# Patient Record
Sex: FEMALE | Race: WHITE | NOT HISPANIC OR LATINO | Employment: FULL TIME | URBAN - METROPOLITAN AREA
[De-identification: names, ages, dates, MRNs, and addresses within clinical notes are randomized per-mention and may not be internally consistent; named-entity substitution may affect disease eponyms.]

---

## 2024-06-19 ENCOUNTER — HOSPITAL ENCOUNTER (EMERGENCY)
Facility: HOSPITAL | Age: 49
Discharge: HOME | End: 2024-06-19
Attending: STUDENT IN AN ORGANIZED HEALTH CARE EDUCATION/TRAINING PROGRAM
Payer: COMMERCIAL

## 2024-06-19 ENCOUNTER — OFFICE VISIT (OUTPATIENT)
Dept: FAMILY MEDICINE CLINIC | Facility: CLINIC | Age: 49
End: 2024-06-19
Payer: COMMERCIAL

## 2024-06-19 VITALS
OXYGEN SATURATION: 100 % | HEART RATE: 82 BPM | BODY MASS INDEX: 24.11 KG/M2 | WEIGHT: 150 LBS | RESPIRATION RATE: 17 BRPM | TEMPERATURE: 99.3 F | DIASTOLIC BLOOD PRESSURE: 110 MMHG | HEIGHT: 66 IN | SYSTOLIC BLOOD PRESSURE: 166 MMHG

## 2024-06-19 VITALS
OXYGEN SATURATION: 100 % | WEIGHT: 150 LBS | HEIGHT: 67 IN | DIASTOLIC BLOOD PRESSURE: 118 MMHG | BODY MASS INDEX: 23.54 KG/M2 | TEMPERATURE: 97.5 F | SYSTOLIC BLOOD PRESSURE: 204 MMHG | HEART RATE: 84 BPM

## 2024-06-19 DIAGNOSIS — Z12.11 SCREENING FOR MALIGNANT NEOPLASM OF COLON: ICD-10-CM

## 2024-06-19 DIAGNOSIS — Z82.49 FAMILY HISTORY OF HEART DISEASE IN MALE FAMILY MEMBER BEFORE AGE 55: ICD-10-CM

## 2024-06-19 DIAGNOSIS — I10 ASYMPTOMATIC HYPERTENSION: Primary | ICD-10-CM

## 2024-06-19 DIAGNOSIS — Z12.31 ENCOUNTER FOR SCREENING MAMMOGRAM FOR MALIGNANT NEOPLASM OF BREAST: ICD-10-CM

## 2024-06-19 DIAGNOSIS — I10 HYPERTENSION, UNCONTROLLED: Primary | ICD-10-CM

## 2024-06-19 LAB
ALBUMIN SERPL-MCNC: 4.6 G/DL (ref 3.5–5.2)
ALBUMIN/GLOB SERPL: 1.7 G/DL
ALP SERPL-CCNC: 59 U/L (ref 39–117)
ALT SERPL W P-5'-P-CCNC: 20 U/L (ref 1–33)
ANION GAP SERPL CALCULATED.3IONS-SCNC: 14 MMOL/L (ref 5–15)
AST SERPL-CCNC: 28 U/L (ref 1–32)
BASOPHILS # BLD AUTO: 0.02 10*3/MM3 (ref 0–0.2)
BASOPHILS NFR BLD AUTO: 0.3 % (ref 0–1.5)
BILIRUB SERPL-MCNC: 0.5 MG/DL (ref 0–1.2)
BUN SERPL-MCNC: 10 MG/DL (ref 6–20)
BUN/CREAT SERPL: 14.9 (ref 7–25)
CALCIUM SPEC-SCNC: 9.6 MG/DL (ref 8.6–10.5)
CHLORIDE SERPL-SCNC: 105 MMOL/L (ref 98–107)
CO2 SERPL-SCNC: 20 MMOL/L (ref 22–29)
CREAT SERPL-MCNC: 0.67 MG/DL (ref 0.57–1)
DEPRECATED RDW RBC AUTO: 43.2 FL (ref 37–54)
EGFRCR SERPLBLD CKD-EPI 2021: 107.3 ML/MIN/1.73
EOSINOPHIL # BLD AUTO: 0.12 10*3/MM3 (ref 0–0.4)
EOSINOPHIL NFR BLD AUTO: 1.9 % (ref 0.3–6.2)
ERYTHROCYTE [DISTWIDTH] IN BLOOD BY AUTOMATED COUNT: 12.4 % (ref 12.3–15.4)
GLOBULIN UR ELPH-MCNC: 2.7 GM/DL
GLUCOSE SERPL-MCNC: 103 MG/DL (ref 65–99)
HCT VFR BLD AUTO: 38.2 % (ref 34–46.6)
HGB BLD-MCNC: 13.1 G/DL (ref 12–15.9)
IMM GRANULOCYTES # BLD AUTO: 0.01 10*3/MM3 (ref 0–0.05)
IMM GRANULOCYTES NFR BLD AUTO: 0.2 % (ref 0–0.5)
LYMPHOCYTES # BLD AUTO: 1.37 10*3/MM3 (ref 0.7–3.1)
LYMPHOCYTES NFR BLD AUTO: 21.7 % (ref 19.6–45.3)
MCH RBC QN AUTO: 32.8 PG (ref 26.6–33)
MCHC RBC AUTO-ENTMCNC: 34.3 G/DL (ref 31.5–35.7)
MCV RBC AUTO: 95.5 FL (ref 79–97)
MONOCYTES # BLD AUTO: 0.31 10*3/MM3 (ref 0.1–0.9)
MONOCYTES NFR BLD AUTO: 4.9 % (ref 5–12)
NEUTROPHILS NFR BLD AUTO: 4.47 10*3/MM3 (ref 1.7–7)
NEUTROPHILS NFR BLD AUTO: 71 % (ref 42.7–76)
NRBC BLD AUTO-RTO: 0 /100 WBC (ref 0–0.2)
NT-PROBNP SERPL-MCNC: 57 PG/ML (ref 0–450)
PLATELET # BLD AUTO: 230 10*3/MM3 (ref 140–450)
PMV BLD AUTO: 9.2 FL (ref 6–12)
POTASSIUM SERPL-SCNC: 3.9 MMOL/L (ref 3.5–5.2)
PROT SERPL-MCNC: 7.3 G/DL (ref 6–8.5)
QT INTERVAL: 429 MS
QTC INTERVAL: 505 MS
RBC # BLD AUTO: 4 10*6/MM3 (ref 3.77–5.28)
SODIUM SERPL-SCNC: 139 MMOL/L (ref 136–145)
TROPONIN T SERPL HS-MCNC: 7 NG/L
WBC NRBC COR # BLD AUTO: 6.3 10*3/MM3 (ref 3.4–10.8)

## 2024-06-19 PROCEDURE — 83880 ASSAY OF NATRIURETIC PEPTIDE: CPT | Performed by: STUDENT IN AN ORGANIZED HEALTH CARE EDUCATION/TRAINING PROGRAM

## 2024-06-19 PROCEDURE — 99283 EMERGENCY DEPT VISIT LOW MDM: CPT

## 2024-06-19 PROCEDURE — 99204 OFFICE O/P NEW MOD 45 MIN: CPT

## 2024-06-19 PROCEDURE — 93005 ELECTROCARDIOGRAM TRACING: CPT | Performed by: STUDENT IN AN ORGANIZED HEALTH CARE EDUCATION/TRAINING PROGRAM

## 2024-06-19 PROCEDURE — 93010 ELECTROCARDIOGRAM REPORT: CPT | Performed by: INTERNAL MEDICINE

## 2024-06-19 PROCEDURE — 93000 ELECTROCARDIOGRAM COMPLETE: CPT

## 2024-06-19 PROCEDURE — 84484 ASSAY OF TROPONIN QUANT: CPT | Performed by: STUDENT IN AN ORGANIZED HEALTH CARE EDUCATION/TRAINING PROGRAM

## 2024-06-19 PROCEDURE — 36415 COLL VENOUS BLD VENIPUNCTURE: CPT

## 2024-06-19 PROCEDURE — 85025 COMPLETE CBC W/AUTO DIFF WBC: CPT | Performed by: STUDENT IN AN ORGANIZED HEALTH CARE EDUCATION/TRAINING PROGRAM

## 2024-06-19 PROCEDURE — 80053 COMPREHEN METABOLIC PANEL: CPT | Performed by: STUDENT IN AN ORGANIZED HEALTH CARE EDUCATION/TRAINING PROGRAM

## 2024-06-19 RX ORDER — AMLODIPINE BESYLATE 5 MG/1
5 TABLET ORAL
Status: DISCONTINUED | OUTPATIENT
Start: 2024-06-19 | End: 2024-06-19 | Stop reason: HOSPADM

## 2024-06-19 RX ORDER — FLUTICASONE PROPIONATE 50 MCG
2 SPRAY, SUSPENSION (ML) NASAL DAILY
COMMUNITY

## 2024-06-19 RX ORDER — AMLODIPINE BESYLATE 5 MG/1
5 TABLET ORAL DAILY
Qty: 30 TABLET | Refills: 0 | Status: SHIPPED | OUTPATIENT
Start: 2024-06-19 | End: 2024-07-19

## 2024-06-19 RX ORDER — CETIRIZINE HYDROCHLORIDE 10 MG/1
10 TABLET ORAL DAILY
COMMUNITY

## 2024-06-19 RX ADMIN — AMLODIPINE BESYLATE 5 MG: 5 TABLET ORAL at 13:42

## 2024-06-19 NOTE — ED NOTES
Pt sent from PCP    Pt bp was 210/100. Pt does not take any BP medications. Pt denies HA or any symptoms.

## 2024-06-19 NOTE — ED PROVIDER NOTES
EMERGENCY DEPARTMENT ENCOUNTER  Room Number:  36/36  PCP: Marisa Pérez APRN  Independent Historians: Patient      HPI:  Chief Complaint: had concerns including Hypertension.     Context: Katheryn Mcarthur is a 49 y.o. female with a medical history of seasonal allergies who presents to the ED c/o acute hypertension.  Patient recently had dental work done and was informed by her dentist that she had high blood pressure.  Patient seen by primary care doctor today and told she had stroke level hypertension and sent to the emergency department for further evaluation.  Patient denies chest pain, shortness of breath, lightheadedness, changes in mental status.  Patient is not on any medication.      Review of prior external notes (non-ED) -and- Review of prior external test results outside of this encounter: Office visit from earlier today with family medicine reviewed and notable for new onset hypertension that was asymptomatic.  Patient was sent to the emergency department for further evaluation.    Prescription drug monitoring program review:         PAST MEDICAL HISTORY  Active Ambulatory Problems     Diagnosis Date Noted    No Active Ambulatory Problems     Resolved Ambulatory Problems     Diagnosis Date Noted    No Resolved Ambulatory Problems     Past Medical History:   Diagnosis Date    Allergic          PAST SURGICAL HISTORY  Past Surgical History:   Procedure Laterality Date    SKIN CANCER EXCISION      WISDOM TOOTH EXTRACTION           FAMILY HISTORY  Family History   Problem Relation Age of Onset    Breast cancer Mother     Heart attack Father     Heart failure Father     No Known Problems Brother          SOCIAL HISTORY  Social History     Socioeconomic History    Marital status:    Tobacco Use    Smoking status: Former     Types: Cigarettes    Smokeless tobacco: Never   Vaping Use    Vaping status: Never Used   Substance and Sexual Activity    Alcohol use: Yes     Alcohol/week: 3.0 standard drinks of  alcohol     Types: 3 Shots of liquor per week    Drug use: Never    Sexual activity: Yes     Partners: Male     Birth control/protection: None         ALLERGIES  Azithromycin      REVIEW OF SYSTEMS  Review of Systems  Included in HPI  All systems reviewed and negative except for those discussed in HPI.      PHYSICAL EXAM    I have reviewed the triage vital signs and nursing notes.    ED Triage Vitals   Temp Heart Rate Resp BP SpO2   06/19/24 1234 06/19/24 1234 06/19/24 1234 06/19/24 1238 06/19/24 1234   99.3 °F (37.4 °C) 102 18 (!) 197/130 98 %      Temp src Heart Rate Source Patient Position BP Location FiO2 (%)   06/19/24 1234 06/19/24 1234 06/19/24 1238 06/19/24 1238 --   Tympanic Monitor Lying Right arm        Physical Exam  GENERAL: alert, no acute distress  SKIN: Warm, dry  HENT: Normocephalic, atraumatic  EYES: no scleral icterus  CV: regular rhythm, regular rate  RESPIRATORY: normal effort, lungs clear  ABDOMEN: soft, nontender, nondistended  MUSCULOSKELETAL: no deformity  NEURO: alert, moves all extremities, follows commands            LAB RESULTS  Recent Results (from the past 24 hour(s))   ECG 12 Lead Chest Pain    Collection Time: 06/19/24  1:01 PM   Result Value Ref Range    QT Interval 429 ms    QTC Interval 505 ms   Comprehensive Metabolic Panel    Collection Time: 06/19/24  1:09 PM    Specimen: Blood   Result Value Ref Range    Glucose 103 (H) 65 - 99 mg/dL    BUN 10 6 - 20 mg/dL    Creatinine 0.67 0.57 - 1.00 mg/dL    Sodium 139 136 - 145 mmol/L    Potassium 3.9 3.5 - 5.2 mmol/L    Chloride 105 98 - 107 mmol/L    CO2 20.0 (L) 22.0 - 29.0 mmol/L    Calcium 9.6 8.6 - 10.5 mg/dL    Total Protein 7.3 6.0 - 8.5 g/dL    Albumin 4.6 3.5 - 5.2 g/dL    ALT (SGPT) 20 1 - 33 U/L    AST (SGOT) 28 1 - 32 U/L    Alkaline Phosphatase 59 39 - 117 U/L    Total Bilirubin 0.5 0.0 - 1.2 mg/dL    Globulin 2.7 gm/dL    A/G Ratio 1.7 g/dL    BUN/Creatinine Ratio 14.9 7.0 - 25.0    Anion Gap 14.0 5.0 - 15.0 mmol/L    eGFR  107.3 >60.0 mL/min/1.73   Single High Sensitivity Troponin T    Collection Time: 06/19/24  1:09 PM    Specimen: Blood   Result Value Ref Range    HS Troponin T 7 <14 ng/L   BNP    Collection Time: 06/19/24  1:09 PM    Specimen: Blood   Result Value Ref Range    proBNP 57.0 0.0 - 450.0 pg/mL   CBC Auto Differential    Collection Time: 06/19/24  1:09 PM    Specimen: Blood   Result Value Ref Range    WBC 6.30 3.40 - 10.80 10*3/mm3    RBC 4.00 3.77 - 5.28 10*6/mm3    Hemoglobin 13.1 12.0 - 15.9 g/dL    Hematocrit 38.2 34.0 - 46.6 %    MCV 95.5 79.0 - 97.0 fL    MCH 32.8 26.6 - 33.0 pg    MCHC 34.3 31.5 - 35.7 g/dL    RDW 12.4 12.3 - 15.4 %    RDW-SD 43.2 37.0 - 54.0 fl    MPV 9.2 6.0 - 12.0 fL    Platelets 230 140 - 450 10*3/mm3    Neutrophil % 71.0 42.7 - 76.0 %    Lymphocyte % 21.7 19.6 - 45.3 %    Monocyte % 4.9 (L) 5.0 - 12.0 %    Eosinophil % 1.9 0.3 - 6.2 %    Basophil % 0.3 0.0 - 1.5 %    Immature Grans % 0.2 0.0 - 0.5 %    Neutrophils, Absolute 4.47 1.70 - 7.00 10*3/mm3    Lymphocytes, Absolute 1.37 0.70 - 3.10 10*3/mm3    Monocytes, Absolute 0.31 0.10 - 0.90 10*3/mm3    Eosinophils, Absolute 0.12 0.00 - 0.40 10*3/mm3    Basophils, Absolute 0.02 0.00 - 0.20 10*3/mm3    Immature Grans, Absolute 0.01 0.00 - 0.05 10*3/mm3    nRBC 0.0 0.0 - 0.2 /100 WBC         RADIOLOGY  No Radiology Exams Resulted Within Past 24 Hours      MEDICATIONS GIVEN IN ER  Medications   amLODIPine (NORVASC) tablet 5 mg (5 mg Oral Given 6/19/24 1342)         ORDERS PLACED DURING THIS VISIT:  Orders Placed This Encounter   Procedures    Comprehensive Metabolic Panel    Single High Sensitivity Troponin T    BNP    CBC Auto Differential    ECG 12 Lead Chest Pain    CBC & Differential         OUTPATIENT MEDICATION MANAGEMENT:  Current Facility-Administered Medications Ordered in Epic   Medication Dose Route Frequency Provider Last Rate Last Admin    amLODIPine (NORVASC) tablet 5 mg  5 mg Oral Q24H Don Pineda MD   5 mg at 06/19/24 0036      Current Outpatient Medications Ordered in Epic   Medication Sig Dispense Refill    amLODIPine (NORVASC) 5 MG tablet Take 1 tablet by mouth Daily for 30 days. 30 tablet 0    cetirizine (zyrTEC) 10 MG tablet Take 1 tablet by mouth Daily.      fluticasone (FLONASE) 50 MCG/ACT nasal spray 2 sprays into the nostril(s) as directed by provider Daily.           PROCEDURES  Procedures            PROGRESS, DATA ANALYSIS, CONSULTS, AND MEDICAL DECISION MAKING  All labs have been independently interpreted by me.  All radiology studies have been reviewed by me. All EKG's have been independently viewed and interpreted by me.  Discussion below represents my analysis of pertinent findings related to patient's condition, differential diagnosis, treatment plan and final disposition.    Differential diagnosis includes but is not limited to asymptomatic hypertension, hypertensive emergency, hypertensive urgency.    Clinical Scores:                   ED Course as of 06/19/24 1401   Wed Jun 19, 2024   1311 EKG interpreted by me demonstrates sinus rhythm, rate 83, no [place] prolongation, borderline QT prolongation, no ST elevation, T wave flattening in the anterior leads and T wave inversions in lead III [MW]   1328 Explained to patient industry standards for asymptomatic hypertension and provided appropriate reassurance.  Will go ahead and proceed with basic laboratory evaluation and EKG.  Will initiate antihypertensive in the emergency department and counseled on need to continue following with primary care for titration and possible medication regimen review. [MW]   1400 Laboratory evaluation in the emergency department is unremarkable and EKG is reassuring.  Patient's blood pressure noted to improve after administration of oral antihypertensives.  Will prescribe 30-day course of antihypertensive and instruct patient on need to follow closely with primary care for medication titration.  Return precautions given and patient discharged  in stable condition. [MW]      ED Course User Index  [MW] Don Pineda MD             AS OF 14:01 EDT VITALS:    BP - (!) 160/112  HR - 80  TEMP - 99.3 °F (37.4 °C) (Tympanic)  O2 SATS - 100%    COMPLEXITY OF CARE  Admission was considered but after careful review of the patient's presentation, physical examination, diagnostic results, and response to treatment the patient may be safely discharged with outpatient follow-up.      DIAGNOSIS  Final diagnoses:   Asymptomatic hypertension         DISPOSITION  ED Disposition       ED Disposition   Discharge    Condition   Stable    Comment   --                Please note that portions of this document were completed with a voice recognition program.    Note Disclaimer: At [facility], we believe that sharing information builds trust and better relationships. You are receiving this note because you recently visited [facility]. It is possible you will see health information before a provider has talked with you about it. This kind of information can be easy to misunderstand. To help you fully understand what it means for your health, we urge you to discuss this note with your provider.         Dno Pineda MD  06/19/24 1652

## 2024-06-19 NOTE — PROGRESS NOTES
Chief Complaint  Establish Care and Hypertension    Subjective          Hypertension  Pertinent negatives include no chest pain, headaches, palpitations or shortness of breath.     Katheryn Mcarthur 49 y.o. female presents today for a new patient appointment. She is here to establish care, is a new patient to me, and due to an elevated blood pressure reading at the dentist office . I reviewed the PFSH recorded today by my MA/LPN staff.       Ms. Mcarthur's blood pressure is high today. She denies a personal history of hypertension and has never taken blood pressure medication.  She is completely asymptomatic.  She reports a recent elevated blood pressure reading at the dentist office.  She does not monitor blood pressure at home. She states she eats a healthy diet and is active.     She denies chest pain, shortness of breath, headaches, blurred vision, dizziness, lightheadedness, near-syncope, and syncope.    She has a family history of heart disease with her father. He had a MI at the age of 38 years, CABG X4 in his 40's, and hypertension. He is still living. No other family history.       Review of Systems   Constitutional:  Negative for chills, fatigue and fever.   HENT:  Negative for congestion and sinus pressure.    Eyes:  Negative for photophobia, pain and visual disturbance.   Respiratory:  Negative for cough, chest tightness and shortness of breath.    Cardiovascular:  Negative for chest pain, palpitations and leg swelling.   Gastrointestinal:  Negative for abdominal pain, constipation, diarrhea, nausea and vomiting.   Genitourinary:  Negative for dysuria, frequency and urgency.   Musculoskeletal:  Negative for back pain.   Skin:  Negative for rash.   Neurological:  Negative for dizziness, tremors, seizures, syncope, facial asymmetry, speech difficulty, weakness, light-headedness and numbness.   Psychiatric/Behavioral:  Negative for behavioral problems and sleep disturbance.         Objective   Vital Signs:  "  BP (!) 204/118   Pulse 84   Temp 97.5 °F (36.4 °C) (Oral)   Ht 168.9 cm (66.5\")   Wt 68 kg (150 lb)   SpO2 100%   BMI 23.85 kg/m²      BMI is within normal parameters. No other follow-up for BMI required.       Physical Exam  Vitals and nursing note reviewed.   Constitutional:       Appearance: She is well-developed and normal weight. She is not ill-appearing, toxic-appearing or diaphoretic.   HENT:      Head: Normocephalic.   Eyes:      General: No scleral icterus.     Pupils: Pupils are equal, round, and reactive to light.   Neck:      Thyroid: No thyromegaly.      Vascular: No carotid bruit.   Cardiovascular:      Rate and Rhythm: Normal rate and regular rhythm.      Pulses: Normal pulses.      Heart sounds: Normal heart sounds.   Pulmonary:      Effort: Pulmonary effort is normal. No respiratory distress.      Breath sounds: Normal breath sounds. No stridor.   Musculoskeletal:         General: No deformity.      Right lower leg: No edema.      Left lower leg: No edema.   Skin:     General: Skin is warm.      Coloration: Skin is not jaundiced.   Neurological:      General: No focal deficit present.      Mental Status: She is alert and oriented to person, place, and time.      Cranial Nerves: No dysarthria or facial asymmetry.      Sensory: Sensation is intact. No sensory deficit.      Motor: No weakness, tremor or seizure activity.      Coordination: Coordination is intact. Coordination normal.      Gait: Gait normal.   Psychiatric:         Behavior: Behavior normal.         Thought Content: Thought content normal.         Judgment: Judgment normal.          The following data was reviewed by: RENALDO Martinez on 06/19/2024:      ECG 12 Lead    Date/Time: 6/19/2024 11:51 AM  Performed by: Marisa Pérez APRN    Authorized by: Marisa Pérez APRN  Comparison: not compared with previous ECG   Previous ECG: no previous ECG available  Rhythm: sinus rhythm  Rate: normal  BPM: 76  Conduction: left anterior " fascicular block  Other findings: prolonged QTc interval    Clinical impression: abnormal EKG                Assessment and Plan      Assessment & Plan  Hypertension, uncontrolled  Patient has new onset Hypertension, asymptomatic  High manual BP reading X2  Ambulatory BP monitoring  Dietary sodium restriction.  EKG obtained-reviewed by Dr. Wilian Keita and myself  Blood pressure will be reassessed at the emergency department today and at the hospital follow up appointment upon discharge.  Declined EMS transport X3--signed refusal to be transported by ambulance form    Family history of heart disease in male family member before age 55  Father-MI at age 38 years, CABG X4 in his 40's  Encounter for screening mammogram for malignant neoplasm of breast    Screening for malignant neoplasm of colon      Orders Placed This Encounter   Procedures    Mammo screening digital tomosynthesis bilateral w CAD    Comprehensive metabolic panel    Lipid panel    TSH    Ambulatory Referral For Screening Colonoscopy    ECG 12 Lead    CBC and Differential                 Follow Up     Return after hospital discharge.    Patient was given instructions and counseling regarding her condition or for health maintenance advice. Please see specific information pulled into the AVS if appropriate.     -The patient was informed I recommend that she is transported to the emergency department by EMS for evaluation of severely elevated blood pressure.  The patient declined EMS transport.  I informed her of the risks of driving with blood pressure at this level and again recommended EMS transport.  She verbalized understanding, and stated again that she wants to drive herself.  The patient signed a refusal to be transported by ambulance form.  -Report called to [facility] ED Triage RN.

## 2024-06-19 NOTE — DISCHARGE INSTRUCTIONS
Please take all medication as prescribed    Please follow-up with your primary care provider as you will likely require dose adjustments and possible medication changes until finding the appropriate medication regimen.    Please return to the ER with new or worsening symptoms including but not limited to chest pain, shortness of breath, lightheadedness, changes in mental status, vision changes.

## 2024-07-09 ENCOUNTER — OFFICE VISIT (OUTPATIENT)
Dept: FAMILY MEDICINE CLINIC | Facility: CLINIC | Age: 49
End: 2024-07-09
Payer: COMMERCIAL

## 2024-07-09 VITALS
HEIGHT: 66 IN | SYSTOLIC BLOOD PRESSURE: 146 MMHG | RESPIRATION RATE: 16 BRPM | HEART RATE: 94 BPM | OXYGEN SATURATION: 100 % | DIASTOLIC BLOOD PRESSURE: 98 MMHG | WEIGHT: 151.6 LBS | BODY MASS INDEX: 24.36 KG/M2 | TEMPERATURE: 97.5 F

## 2024-07-09 DIAGNOSIS — I10 PRIMARY HYPERTENSION: Primary | ICD-10-CM

## 2024-07-09 PROCEDURE — 99213 OFFICE O/P EST LOW 20 MIN: CPT

## 2024-07-09 RX ORDER — AMLODIPINE BESYLATE 10 MG/1
10 TABLET ORAL DAILY
Qty: 90 TABLET | Refills: 1 | Status: SHIPPED | OUTPATIENT
Start: 2024-07-09

## 2024-07-09 NOTE — ASSESSMENT & PLAN NOTE
Hypertension is uncontrolled  Medication changes per orders.  Dietary sodium restriction.  Regular aerobic exercise.  Ambulatory blood pressure monitoring.  Blood pressure will be reassessed in 4 weeks.

## 2024-07-09 NOTE — PROGRESS NOTES
"Chief Complaint  Hypertension    Subjective          Hypertension  Pertinent negatives include no chest pain, headaches, palpitations or shortness of breath.     Katheryn Mcarthur 49 y.o. female presents for a follow up on hypertension. She has been monitoring BP at home and it has averaged 139/99 mmHg. She has been compliant with taking Amlodipine 5 mg daily.  She has tolerated Amlodipine well with no side effects.  She denies chest pain, shortness of breath, headaches, blurred vision, dizziness, lightheadedness, near-syncope, and syncope.    She has limited sodium-rich foods and caffeine. She drinks plenty of water. She exercises 3 days per week.  Blood pressure has improved, but is not at goal.    After a discussion with the patient today, she is in agreement to increase Amlodipine to 10 mg daily.        Review of Systems   Constitutional:  Negative for chills, fatigue and fever.   HENT:  Negative for congestion and sinus pressure.    Eyes:  Negative for visual disturbance.   Respiratory:  Negative for cough, chest tightness and shortness of breath.    Cardiovascular:  Negative for chest pain, palpitations and leg swelling.   Gastrointestinal:  Negative for abdominal pain, constipation, diarrhea, nausea and vomiting.   Genitourinary:  Negative for dysuria, frequency and urgency.   Musculoskeletal:  Negative for back pain.   Skin:  Negative for rash.   Neurological:  Negative for dizziness, syncope, facial asymmetry, speech difficulty, weakness and light-headedness.   Psychiatric/Behavioral:  Negative for behavioral problems and sleep disturbance.         Objective   Vital Signs:   /98 (BP Location: Left arm, Patient Position: Sitting, Cuff Size: Small Adult)   Pulse 94   Temp 97.5 °F (36.4 °C) (Oral)   Resp 16   Ht 167.6 cm (65.98\")   Wt 68.8 kg (151 lb 9.6 oz)   SpO2 100%   BMI 24.48 kg/m²      BMI is within normal parameters. No other follow-up for BMI required.       Physical Exam  Vitals and " nursing note reviewed.   Constitutional:       Appearance: She is well-developed and normal weight. She is not ill-appearing, toxic-appearing or diaphoretic.   HENT:      Head: Normocephalic.   Eyes:      General: No scleral icterus.     Pupils: Pupils are equal, round, and reactive to light.   Neck:      Thyroid: No thyromegaly.      Vascular: No carotid bruit.   Cardiovascular:      Rate and Rhythm: Normal rate and regular rhythm.      Pulses: Normal pulses.      Heart sounds: Normal heart sounds.   Pulmonary:      Effort: Pulmonary effort is normal. No respiratory distress.      Breath sounds: Normal breath sounds. No stridor.   Musculoskeletal:         General: No deformity.      Right lower leg: No edema.      Left lower leg: No edema.   Skin:     General: Skin is warm.      Coloration: Skin is not jaundiced.   Neurological:      General: No focal deficit present.      Mental Status: She is alert and oriented to person, place, and time.      Cranial Nerves: No dysarthria or facial asymmetry.      Sensory: Sensation is intact. No sensory deficit.      Motor: No weakness, tremor or seizure activity.      Coordination: Coordination is intact. Coordination normal.      Gait: Gait normal.   Psychiatric:         Behavior: Behavior normal.         Thought Content: Thought content normal.         Judgment: Judgment normal.                         Assessment and Plan      Assessment & Plan  Primary hypertension  Hypertension is uncontrolled  Medication changes per orders.  Dietary sodium restriction.  Regular aerobic exercise.  Ambulatory blood pressure monitoring.  Blood pressure will be reassessed in 4 weeks.     New Medications Ordered This Visit   Medications    amLODIPine (NORVASC) 10 MG tablet     Sig: Take 1 tablet by mouth Daily.     Dispense:  90 tablet     Refill:  1              Follow Up     Return in about 4 weeks (around 8/6/2024) for Next scheduled follow up.    Patient was given instructions and  counseling regarding her condition or for health maintenance advice. Please see specific information pulled into the AVS if appropriate.

## 2024-08-06 ENCOUNTER — OFFICE VISIT (OUTPATIENT)
Dept: FAMILY MEDICINE CLINIC | Facility: CLINIC | Age: 49
End: 2024-08-06
Payer: COMMERCIAL

## 2024-08-06 VITALS
HEIGHT: 66 IN | SYSTOLIC BLOOD PRESSURE: 140 MMHG | WEIGHT: 147 LBS | DIASTOLIC BLOOD PRESSURE: 86 MMHG | OXYGEN SATURATION: 95 % | BODY MASS INDEX: 23.63 KG/M2 | TEMPERATURE: 98.2 F | HEART RATE: 93 BPM

## 2024-08-06 DIAGNOSIS — I10 PRIMARY HYPERTENSION: Primary | ICD-10-CM

## 2024-08-06 PROCEDURE — 99213 OFFICE O/P EST LOW 20 MIN: CPT

## 2024-08-06 RX ORDER — HYDROCHLOROTHIAZIDE 12.5 MG/1
12.5 TABLET ORAL DAILY
Qty: 30 TABLET | Refills: 1 | Status: SHIPPED | OUTPATIENT
Start: 2024-08-06

## 2024-08-06 NOTE — PROGRESS NOTES
"Chief Complaint  Hypertension    Subjective          History of Present Illness    Katheryn Mcarthur 49 y.o. female presents today for a four week follow up on hypertension. Amlodipine was increased to 10 mg once daily at her last appointment.     Ms. Mcarthur has been compliant with taking medication as directed, and has tolerated the increased dose well with minimal pedal edema that does improve. She has been monitoring blood pressure at home and it has averaged 140/90 mmHg. She denies chest pain, shortness of breath, headaches, blurred vision, dizziness, lightheadedness, near-syncope, and syncope.     Blood pressure has improved slightly, but still not at goal. Blood pressure readings from home are above goal. After a discussion with the patient today, she is in agreement to continue Amlodipine 10 mg and add HCTZ 12.5 mg. No history of gout. Kidney function and calcium were normal on labs from June.        Review of Systems   Constitutional:  Negative for chills, fatigue and fever.   Eyes:  Negative for photophobia and visual disturbance.   Respiratory:  Negative for cough, chest tightness and shortness of breath.    Cardiovascular:  Positive for leg swelling (minimal swelling in feet, improved). Negative for chest pain and palpitations.   Gastrointestinal:  Negative for diarrhea, nausea and vomiting.   Skin:  Negative for rash.   Neurological:  Negative for dizziness, syncope, facial asymmetry, speech difficulty, weakness and light-headedness.        Objective   Vital Signs:   /86 (BP Location: Left arm, Patient Position: Sitting, Cuff Size: Adult)   Pulse 93   Temp 98.2 °F (36.8 °C) (Oral)   Ht 167.6 cm (66\")   Wt 66.7 kg (147 lb)   SpO2 95%   BMI 23.73 kg/m²      BMI is within normal parameters. No other follow-up for BMI required.       Physical Exam  Vitals and nursing note reviewed.   Constitutional:       General: She is not in acute distress.     Appearance: She is well-developed and normal " weight. She is not diaphoretic.   HENT:      Head: Normocephalic.   Eyes:      General: No scleral icterus.     Pupils: Pupils are equal, round, and reactive to light.   Neck:      Thyroid: No thyromegaly.   Cardiovascular:      Rate and Rhythm: Normal rate and regular rhythm.      Pulses: Normal pulses.      Heart sounds: Normal heart sounds.      Comments: Bilateral trace pedal edema.   Pulmonary:      Effort: Pulmonary effort is normal. No respiratory distress.      Breath sounds: Normal breath sounds. No stridor.   Musculoskeletal:         General: No deformity.      Right lower leg: Edema present.      Left lower leg: Edema present.   Skin:     General: Skin is warm.      Coloration: Skin is not jaundiced.   Neurological:      General: No focal deficit present.      Mental Status: She is alert and oriented to person, place, and time.      Cranial Nerves: No dysarthria or facial asymmetry.      Sensory: Sensation is intact. No sensory deficit.      Motor: No weakness, tremor or seizure activity.      Coordination: Coordination is intact. Coordination normal.      Gait: Gait normal.   Psychiatric:         Behavior: Behavior normal.         Thought Content: Thought content normal.         Judgment: Judgment normal.          The following data was reviewed by: RENALDO Martinez on 08/06/2024:  Comprehensive Metabolic Panel (06/19/2024 13:09)              Assessment and Plan      Assessment & Plan  Primary hypertension  Hypertension has improved, but remains elevated; asymptomatic  Medication changes per orders.  Dietary sodium restriction.  Weight loss.  Regular aerobic exercise.  Ambulatory blood pressure monitoring.  Continue Amlodipine 10 mg, add HCTZ 12.5 mg  Blood pressure will be reassessed in 4 weeks.     New Medications Ordered This Visit   Medications    hydroCHLOROthiazide 12.5 MG tablet     Sig: Take 1 tablet by mouth Daily.     Dispense:  30 tablet     Refill:  1              Follow Up     Return in  about 4 weeks (around 9/3/2024) for Next scheduled follow up.    Patient was given instructions and counseling regarding her condition or for health maintenance advice. Please see specific information pulled into the AVS if appropriate.

## 2024-08-06 NOTE — ASSESSMENT & PLAN NOTE
Hypertension has improved, but remainselevated; asymptomatic  Medication changes per orders.  Dietary sodium restriction.  Weight loss.  Regular aerobic exercise.  Ambulatory blood pressure monitoring.  Continue Amlodipine 10 mg, add HCTZ 12.5 mg  Blood pressure will be reassessed in 4 weeks.

## 2024-08-12 ENCOUNTER — HOSPITAL ENCOUNTER (OUTPATIENT)
Dept: MAMMOGRAPHY | Facility: HOSPITAL | Age: 49
Discharge: HOME | End: 2024-08-12
Payer: COMMERCIAL

## 2024-08-12 PROCEDURE — 77063 BREAST TOMOSYNTHESIS BI: CPT

## 2024-08-12 PROCEDURE — 77067 SCR MAMMO BI INCL CAD: CPT

## 2024-09-04 ENCOUNTER — OFFICE VISIT (OUTPATIENT)
Dept: FAMILY MEDICINE CLINIC | Facility: CLINIC | Age: 49
End: 2024-09-04
Payer: COMMERCIAL

## 2024-09-04 VITALS
TEMPERATURE: 98.4 F | WEIGHT: 148.8 LBS | DIASTOLIC BLOOD PRESSURE: 88 MMHG | BODY MASS INDEX: 23.91 KG/M2 | OXYGEN SATURATION: 97 % | HEIGHT: 66 IN | SYSTOLIC BLOOD PRESSURE: 154 MMHG | HEART RATE: 96 BPM

## 2024-09-04 DIAGNOSIS — I10 PRIMARY HYPERTENSION: Primary | ICD-10-CM

## 2024-09-04 PROCEDURE — 99213 OFFICE O/P EST LOW 20 MIN: CPT

## 2024-09-04 RX ORDER — LOSARTAN POTASSIUM AND HYDROCHLOROTHIAZIDE 12.5; 5 MG/1; MG/1
1 TABLET ORAL DAILY
Qty: 30 TABLET | Refills: 1 | Status: SHIPPED | OUTPATIENT
Start: 2024-09-04

## 2024-09-04 NOTE — ASSESSMENT & PLAN NOTE
Hypertension is uncontrolled  D/C Amlodipine due to LE edema.  Keep legs elevated, ice as needed  Start Losartan-HCTZ 50-12.5 mg once daily.  Medication changes per orders.  Dietary sodium restriction.  Regular aerobic exercise.  Ambulatory blood pressure monitoring.  Blood pressure will be reassessed in 4 weeks.

## 2024-10-04 ENCOUNTER — OFFICE VISIT (OUTPATIENT)
Dept: FAMILY MEDICINE CLINIC | Facility: CLINIC | Age: 49
End: 2024-10-04
Payer: COMMERCIAL

## 2024-10-04 VITALS
DIASTOLIC BLOOD PRESSURE: 96 MMHG | HEART RATE: 68 BPM | BODY MASS INDEX: 23.66 KG/M2 | TEMPERATURE: 98.3 F | HEIGHT: 66 IN | SYSTOLIC BLOOD PRESSURE: 130 MMHG | OXYGEN SATURATION: 97 % | WEIGHT: 147.2 LBS

## 2024-10-04 DIAGNOSIS — I10 PRIMARY HYPERTENSION: Primary | ICD-10-CM

## 2024-10-04 PROCEDURE — 99213 OFFICE O/P EST LOW 20 MIN: CPT

## 2024-10-04 RX ORDER — LOSARTAN POTASSIUM AND HYDROCHLOROTHIAZIDE 12.5; 5 MG/1; MG/1
1 TABLET ORAL DAILY
Qty: 90 TABLET | Refills: 1 | Status: SHIPPED | OUTPATIENT
Start: 2024-10-04

## 2024-10-04 NOTE — PROGRESS NOTES
"Chief Complaint  Hypertension    Subjective          Hypertension  Pertinent negatives include no chest pain, palpitations or shortness of breath.     Katheryn Chen 49 y.o. female presents for a 4-week follow up on hypertension. The patient was last seen on 9/4/2024.     Amlodipine was discontinued at the patient's last appointment due to LE edema, and Losartan-HCTZ 50-12.5 mg once daily was started.     She has been monitoring blood pressure at home and they have averaged 120/82 mmHg. She admits she feels anxious here. She has tolerated the Losartan-HCTZ 50-12.5 mg well. She reports the LE edema has completely resolved.    She denies chest pain, shortness of breath, headaches, blurred vision, dizziness, lightheadedness, near-syncope, and syncope.             Review of Systems   Constitutional:  Negative for chills, fatigue and fever.   Eyes:  Negative for visual disturbance.   Respiratory:  Negative for cough, chest tightness and shortness of breath.    Cardiovascular:  Negative for chest pain, palpitations and leg swelling.   Gastrointestinal:  Negative for abdominal pain, constipation, diarrhea, nausea and vomiting.   Genitourinary:  Negative for dysuria.   Musculoskeletal:  Negative for back pain.   Skin:  Negative for rash.   Neurological:  Negative for dizziness, syncope and light-headedness.        Objective   Vital Signs:   /96   Pulse 68   Temp 98.3 °F (36.8 °C) (Oral)   Ht 167.6 cm (66\")   Wt 66.8 kg (147 lb 3.2 oz)   SpO2 97%   BMI 23.76 kg/m²      BMI is within normal parameters. No other follow-up for BMI required.       Physical Exam  Vitals and nursing note reviewed.   Constitutional:       General: She is not in acute distress.     Appearance: She is well-developed and normal weight.   HENT:      Head: Normocephalic.   Eyes:      General: No scleral icterus.     Pupils: Pupils are equal, round, and reactive to light.   Neck:      Thyroid: No thyromegaly.      Vascular: No carotid bruit. "   Cardiovascular:      Rate and Rhythm: Normal rate and regular rhythm.      Pulses: Normal pulses.      Heart sounds: Normal heart sounds.   Pulmonary:      Effort: Pulmonary effort is normal. No respiratory distress.      Breath sounds: Normal breath sounds. No stridor.   Musculoskeletal:         General: No deformity.      Right lower leg: No edema.      Left lower leg: No edema.   Skin:     General: Skin is warm.      Coloration: Skin is not jaundiced.   Neurological:      General: No focal deficit present.      Mental Status: She is alert and oriented to person, place, and time.      Cranial Nerves: No dysarthria or facial asymmetry.      Sensory: Sensation is intact. No sensory deficit.      Motor: No weakness.      Coordination: Coordination is intact. Coordination normal.      Gait: Gait normal.   Psychiatric:         Mood and Affect: Mood is anxious. Mood is not depressed.         Behavior: Behavior normal.         Thought Content: Thought content normal.         Judgment: Judgment normal.                         Assessment and Plan      Assessment & Plan  Primary hypertension  Hypertension has improved, asymptomatic  Continue current treatment regimen.  Dietary sodium restriction.  Regular aerobic exercise.  Ambulatory blood pressure monitoring.  Work on relaxation techniques  Blood pressure will be reassessed in 2 months .     New Medications Ordered This Visit   Medications    losartan-hydrochlorothiazide (Hyzaar) 50-12.5 MG per tablet     Sig: Take 1 tablet by mouth Daily.     Dispense:  90 tablet     Refill:  1              Follow Up     Return in about 2 months (around 12/4/2024) for Next scheduled follow up.    Patient was given instructions and counseling regarding her condition or for health maintenance advice. Please see specific information pulled into the AVS if appropriate.

## 2024-10-04 NOTE — ASSESSMENT & PLAN NOTE
Hypertension has improved, asymptomatic  Continue current treatment regimen.  Dietary sodium restriction.  Regular aerobic exercise.  Ambulatory blood pressure monitoring.  Work on relaxation techniques  Blood pressure will be reassessedin 2 months.

## 2025-02-19 ENCOUNTER — OFFICE VISIT (OUTPATIENT)
Dept: FAMILY MEDICINE CLINIC | Facility: CLINIC | Age: 50
End: 2025-02-19

## 2025-02-19 VITALS
TEMPERATURE: 98.1 F | WEIGHT: 152.2 LBS | OXYGEN SATURATION: 100 % | SYSTOLIC BLOOD PRESSURE: 138 MMHG | DIASTOLIC BLOOD PRESSURE: 90 MMHG | HEART RATE: 74 BPM | BODY MASS INDEX: 24.46 KG/M2 | HEIGHT: 66 IN

## 2025-02-19 DIAGNOSIS — I10 PRIMARY HYPERTENSION: ICD-10-CM

## 2025-02-19 PROCEDURE — 99212 OFFICE O/P EST SF 10 MIN: CPT

## 2025-02-19 RX ORDER — LOSARTAN POTASSIUM AND HYDROCHLOROTHIAZIDE 12.5; 5 MG/1; MG/1
1 TABLET ORAL DAILY
Qty: 90 TABLET | Refills: 1 | Status: SHIPPED | OUTPATIENT
Start: 2025-02-19

## 2025-02-19 NOTE — ASSESSMENT & PLAN NOTE
Elevated in the office--under a lot of stress. Normal BP at home.   Work on relaxation techniques.  Continue a low-sodium diet and daily exercise.   Monitor BP at home and report abnormal readings.   Follow up in 3 months.      Orders:    losartan-hydrochlorothiazide (Hyzaar) 50-12.5 MG per tablet; Take 1 tablet by mouth Daily.

## 2025-02-19 NOTE — PROGRESS NOTES
"Chief Complaint  Hypertension    Subjective          History of Present Illness  History of Present Illness      Katheryn Chen 50 y.o. female presents for medical management. Since the last visit, she has overall felt stressed.  She has Primary Hypertension with elevated blood pressure in the office due to stress, but BP has been normal at home.  She has been compliant with current medications, and I have reviewed them. The patient denies medication side effects. Will refill medication.     Ms. Chen has been under a lot of stress lately due to her  lost his job which resulted in a loss of medical insurance for the patient. She was able to obtain temporary insurance coverage, and will change her coverage to her employer soon. The loss of her 's job and the loss of her insurance coverage has caused a lot of stress. She has been monitoring blood pressure at home and it has averaged 124/83 mmHg. Blood pressure is elevated today because the patient received a stressful phone call prior to her appointment today. She has been compliant with taking her blood pressure medication as directed. She denies all associated symptoms.       Review of Systems   Eyes:  Negative for visual disturbance.   Respiratory:  Negative for chest tightness and shortness of breath.    Cardiovascular:  Negative for chest pain and palpitations.   Neurological:  Negative for dizziness, speech difficulty and light-headedness.   Psychiatric/Behavioral:  Negative for behavioral problems and dysphoric mood. The patient is nervous/anxious (stress).         Objective   Vital Signs:   /90   Pulse 74   Temp 98.1 °F (36.7 °C) (Oral)   Ht 167.6 cm (66\")   Wt 69 kg (152 lb 3.2 oz)   SpO2 100%   BMI 24.57 kg/m²      BMI is within normal parameters. No other follow-up for BMI required.       Physical Exam  Vitals and nursing note reviewed.   Constitutional:       General: She is not in acute distress.     Appearance: She is " well-developed and normal weight.   HENT:      Head: Normocephalic.   Eyes:      General: No scleral icterus.     Pupils: Pupils are equal, round, and reactive to light.   Neck:      Vascular: No carotid bruit.   Cardiovascular:      Rate and Rhythm: Normal rate and regular rhythm.      Pulses: Normal pulses.      Heart sounds: Normal heart sounds.   Pulmonary:      Effort: Pulmonary effort is normal. No respiratory distress.      Breath sounds: Normal breath sounds. No stridor.   Musculoskeletal:      Right lower leg: No edema.      Left lower leg: No edema.   Skin:     General: Skin is warm.   Neurological:      General: No focal deficit present.      Mental Status: She is alert and oriented to person, place, and time.      Cranial Nerves: No dysarthria or facial asymmetry.      Sensory: Sensation is intact.      Motor: No weakness.      Coordination: Coordination is intact.   Psychiatric:         Mood and Affect: Mood normal. Mood is anxious. Mood is not depressed.        Physical Exam        Results                 Assessment and Plan    Assessment & Plan      Assessment & Plan  Primary hypertension  Elevated in the office--under a lot of stress. Normal BP at home.   Work on relaxation techniques.  Continue a low-sodium diet and daily exercise.   Monitor BP at home and report abnormal readings.   Follow up in 3 months.      Orders:    losartan-hydrochlorothiazide (Hyzaar) 50-12.5 MG per tablet; Take 1 tablet by mouth Daily.         I spent 15 minutes caring for Katheryn on this date of service. This time includes time spent by me in the following activities:preparing for the visit, obtaining and/or reviewing a separately obtained history, performing a medically appropriate examination and/or evaluation , counseling and educating the patient/family/caregiver, ordering medications, tests, or procedures, and documenting information in the medical record    Follow Up     Return in about 3 months (around 5/19/2025) for  Next scheduled follow up.    Patient was given instructions and counseling regarding her condition or for health maintenance advice. Please see specific information pulled into the AVS if appropriate.

## 2025-05-20 ENCOUNTER — OFFICE VISIT (OUTPATIENT)
Dept: FAMILY MEDICINE CLINIC | Facility: CLINIC | Age: 50
End: 2025-05-20

## 2025-05-20 VITALS
HEIGHT: 66 IN | HEART RATE: 90 BPM | TEMPERATURE: 98.2 F | SYSTOLIC BLOOD PRESSURE: 138 MMHG | OXYGEN SATURATION: 99 % | WEIGHT: 151 LBS | BODY MASS INDEX: 24.27 KG/M2 | DIASTOLIC BLOOD PRESSURE: 98 MMHG

## 2025-05-20 DIAGNOSIS — I10 PRIMARY HYPERTENSION: Primary | ICD-10-CM

## 2025-05-20 PROCEDURE — 99212 OFFICE O/P EST SF 10 MIN: CPT

## 2025-05-20 RX ORDER — LOSARTAN POTASSIUM AND HYDROCHLOROTHIAZIDE 12.5; 5 MG/1; MG/1
1 TABLET ORAL DAILY
Qty: 90 TABLET | Refills: 1 | Status: SHIPPED | OUTPATIENT
Start: 2025-05-20

## 2025-05-20 NOTE — PROGRESS NOTES
"Chief Complaint  Hypertension    Subjective          Hypertension  Associated symptoms: no chest pain, no headaches, no palpitations, no shortness of breath and no dizziness           Katheryn Chen 50 y.o. female presents for medical management. Since the last visit, she has overall felt well. She has hypertension with whitecoat syndrome, and blood pressure readings are consistently normal at home.  She has been compliant with current medications, and I have reviewed them. The patient denies medication side effects. Will refill medication.     Ms. Chen monitors blood pressure at home and it has averaged 122/84 mmHg. She has not missed any doses of blood pressure medication. She is completely asymptomatic and denies associated symptoms. No chest pain, palpitations, headaches, blurred vision, dizziness, lightheadedness, near-syncope, or syncope.     I do believe there is white coat syndrome component, as her blood pressure readings are consistently normal at home, she is symptomatic, and blood pressure was elevated at the dentist office as well.        Review of Systems   Constitutional:  Negative for chills, fatigue and fever.   Eyes:  Negative for visual disturbance.   Respiratory:  Negative for cough, chest tightness and shortness of breath.    Cardiovascular:  Negative for chest pain, palpitations and leg swelling.   Gastrointestinal:  Negative for abdominal pain, constipation, diarrhea, nausea and vomiting.   Genitourinary:  Negative for dysuria.   Musculoskeletal:  Negative for back pain.   Skin:  Negative for rash.   Neurological:  Negative for dizziness, syncope, speech difficulty, weakness and light-headedness.        Objective   Vital Signs:   /98   Pulse 90   Temp 98.2 °F (36.8 °C) (Oral)   Ht 167.6 cm (66\")   Wt 68.5 kg (151 lb)   SpO2 99%   BMI 24.37 kg/m²      BMI is within normal parameters. No other follow-up for BMI required.       Physical Exam  Vitals and nursing note reviewed. "   Constitutional:       General: She is not in acute distress.     Appearance: She is well-developed and normal weight.   HENT:      Head: Normocephalic.   Eyes:      General: No scleral icterus.     Pupils: Pupils are equal, round, and reactive to light.   Neck:      Vascular: No carotid bruit.   Cardiovascular:      Rate and Rhythm: Normal rate and regular rhythm.      Pulses: Normal pulses.      Heart sounds: Normal heart sounds.   Pulmonary:      Effort: Pulmonary effort is normal. No respiratory distress.      Breath sounds: Normal breath sounds. No stridor.   Musculoskeletal:      Right lower leg: No edema.      Left lower leg: No edema.   Skin:     General: Skin is warm.   Neurological:      General: No focal deficit present.      Mental Status: She is alert and oriented to person, place, and time.      Cranial Nerves: No dysarthria or facial asymmetry.      Sensory: Sensation is intact.      Motor: No weakness.      Coordination: Coordination is intact.                    Results                 Assessment and Plan                 Primary hypertension  Manual BP recheck improved.  Whitecoat syndrome, asymptomatic.  BP readings are consistently normal at home.  Continue current medication with daily exercise.  Monitor BP at home and report abnormal readings.  Labs ordered.  Follow-up in 6 months, and sooner if BP is 140/90 or higher at home.    Orders:    Comprehensive metabolic panel    Lipid panel    CBC and Differential    TSH    losartan-hydrochlorothiazide (Hyzaar) 50-12.5 MG per tablet; Take 1 tablet by mouth Daily.             Follow Up     Return in about 6 months (around 11/20/2025) for Next scheduled follow up.    Patient was given instructions and counseling regarding her condition or for health maintenance advice. Please see specific information pulled into the AVS if appropriate.

## 2025-05-20 NOTE — ASSESSMENT & PLAN NOTE
Manual BP recheck improved.  Whitecoat syndrome, asymptomatic.  BP readings are consistently normal at home.  Continue current medication with daily exercise.  Monitor BP at home and report abnormal readings.  Labs ordered.  Follow-up in 6 months, and sooner if BP is 140/90 or higher at home.    Orders:    Comprehensive metabolic panel    Lipid panel    CBC and Differential    TSH    losartan-hydrochlorothiazide (Hyzaar) 50-12.5 MG per tablet; Take 1 tablet by mouth Daily.

## 2025-08-21 DIAGNOSIS — I10 PRIMARY HYPERTENSION: ICD-10-CM

## 2025-08-21 RX ORDER — LOSARTAN POTASSIUM AND HYDROCHLOROTHIAZIDE 12.5; 5 MG/1; MG/1
1 TABLET ORAL DAILY
Qty: 90 TABLET | Refills: 1 | OUTPATIENT
Start: 2025-08-21